# Patient Record
Sex: FEMALE | Race: WHITE | NOT HISPANIC OR LATINO
[De-identification: names, ages, dates, MRNs, and addresses within clinical notes are randomized per-mention and may not be internally consistent; named-entity substitution may affect disease eponyms.]

---

## 2021-07-08 ENCOUNTER — RESULT REVIEW (OUTPATIENT)
Age: 76
End: 2021-07-08

## 2023-02-01 ENCOUNTER — APPOINTMENT (OUTPATIENT)
Dept: ORTHOPEDIC SURGERY | Facility: CLINIC | Age: 78
End: 2023-02-01

## 2023-09-22 ENCOUNTER — APPOINTMENT (OUTPATIENT)
Dept: ORTHOPEDIC SURGERY | Facility: CLINIC | Age: 78
End: 2023-09-22
Payer: MEDICARE

## 2023-09-22 VITALS
WEIGHT: 170 LBS | BODY MASS INDEX: 29.02 KG/M2 | HEIGHT: 64 IN | HEART RATE: 60 BPM | DIASTOLIC BLOOD PRESSURE: 79 MMHG | SYSTOLIC BLOOD PRESSURE: 118 MMHG

## 2023-09-22 DIAGNOSIS — N28.9 DISORDER OF KIDNEY AND URETER, UNSPECIFIED: ICD-10-CM

## 2023-09-22 DIAGNOSIS — M17.5 OTHER UNILATERAL SECONDARY OSTEOARTHRITIS OF KNEE: ICD-10-CM

## 2023-09-22 DIAGNOSIS — Z80.0 FAMILY HISTORY OF MALIGNANT NEOPLASM OF DIGESTIVE ORGANS: ICD-10-CM

## 2023-09-22 PROCEDURE — 20610 DRAIN/INJ JOINT/BURSA W/O US: CPT | Mod: 50

## 2023-09-22 PROCEDURE — 72170 X-RAY EXAM OF PELVIS: CPT | Mod: 59

## 2023-09-22 PROCEDURE — 99204 OFFICE O/P NEW MOD 45 MIN: CPT | Mod: 25

## 2023-09-22 PROCEDURE — 73564 X-RAY EXAM KNEE 4 OR MORE: CPT | Mod: 50

## 2023-09-22 RX ORDER — HYALURONATE SOD, CROSS-LINKED 30 MG/3 ML
30 SYRINGE (ML) INTRAARTICULAR
Refills: 0 | Status: COMPLETED | OUTPATIENT
Start: 2023-09-22

## 2023-09-22 RX ADMIN — Medication 0 MG/3ML: at 00:00

## 2023-11-09 ENCOUNTER — APPOINTMENT (OUTPATIENT)
Dept: ORTHOPEDIC SURGERY | Facility: CLINIC | Age: 78
End: 2023-11-09
Payer: MEDICARE

## 2023-11-09 PROCEDURE — 99214 OFFICE O/P EST MOD 30 MIN: CPT

## 2023-11-29 ENCOUNTER — APPOINTMENT (OUTPATIENT)
Dept: ORTHOPEDIC SURGERY | Facility: CLINIC | Age: 78
End: 2023-11-29
Payer: MEDICARE

## 2023-11-29 DIAGNOSIS — M17.0 BILATERAL PRIMARY OSTEOARTHRITIS OF KNEE: ICD-10-CM

## 2023-11-29 PROCEDURE — 99213 OFFICE O/P EST LOW 20 MIN: CPT

## 2024-07-25 ENCOUNTER — APPOINTMENT (OUTPATIENT)
Dept: ORTHOPEDIC SURGERY | Facility: CLINIC | Age: 79
End: 2024-07-25
Payer: MEDICARE

## 2024-07-25 DIAGNOSIS — M25.552 PAIN IN LEFT HIP: ICD-10-CM

## 2024-07-25 DIAGNOSIS — M89.8X5 OTHER SPECIFIED DISORDERS OF BONE, THIGH: ICD-10-CM

## 2024-07-25 PROCEDURE — 73502 X-RAY EXAM HIP UNI 2-3 VIEWS: CPT

## 2024-07-25 PROCEDURE — 99214 OFFICE O/P EST MOD 30 MIN: CPT

## 2024-07-25 NOTE — HISTORY OF PRESENT ILLNESS
[de-identified] : Patient presents for follow-up of bilateral left greater than right knee pain and new onset left hip pain for the past week and a half.  She has a history of CKD and is borderline dialysis candidate.  We have previously treated her knees with MRIs and injections which did not provide much relief.  Her left hip is bothering her to walk

## 2024-07-25 NOTE — DISCUSSION/SUMMARY
[de-identified] : I discussed and discussed x-rays and clinical findings with the patient.  Explained that I would like to obtain an MRI of the left hip to evaluate the lytic lesion in the intertrochanteric region.  She has borderline dilated dialysis patient therefore it was ordered without contrast.  Regarding her bilateral knees she had given her severe CKD her risk of complications from surgery would be significantly higher than average.  I like her to see the pain management/PMNR doctors for possible radiofrequency ablation of the genicular nerve.  We also can consider IR for nerve ablation if the radiofrequency ablation does not work.  She will follow-up with me after the MRI is complete.  I discussed that she develop worsening pain with weightbearing she should go to the ER.  All questions were answered she is agreement the plan

## 2024-07-25 NOTE — PHYSICAL EXAM
[de-identified] : General Appearance / Station: Well developed, well nourished, in no acute distress Orientation: Oriented to person, place, and time Gait & Station: Ambulates without assistive device Neurologic: Normal leg sensation Cardiovascular: Warm extremity Lymphatics: No lymphedema Generalized Ligament Laxity: Normal Stiffness: Normal.  LUMBAR SPINE Nontender at lumbar spine Straight leg raise: Negative Motor: 5/5 motor L2-S1 Sensation Intact. No paresthesias L2-S1  SYMPTOMATIC LEFT HIP Range of motion: PAINFUL internal and external rotation of the hip. Strength: Within Normal Limits. Negative Trendelenburg sign                                                                      FADIR: POSITIVE Stinchfield: POSITIVE, with groin pain Palpation: TENDER at greater trochanter, Nontender SI joint                    LEFT KNEE Alignment: Neutral Skin: Normal.  Effusion: None Quadriceps: Normal Range of motion: Normal PF crepitus: 1+ PF apprehension: None Patella / Patella Tendon: None Palpation: medial/ lateral joint line  RIGHT  KNEE Alignment: Neutral Skin: Normal.  Effusion: None Quadriceps: Normal Range of motion: Normal PF crepitus: 1+ PF apprehension: None Patella / Patella Tendon: None Palpation: medial/ lateral joint line [de-identified] : Imaging: AP Pelvis and lateral views of the left hip show left hip mild joint space narrowing.  There is a lytic lesion in the inner troches region of the left hip with sclerotic border.   The soft tissues appear unremarkable

## 2024-08-04 ENCOUNTER — OUTPATIENT (OUTPATIENT)
Dept: OUTPATIENT SERVICES | Facility: HOSPITAL | Age: 79
LOS: 1 days | End: 2024-08-04
Payer: MEDICARE

## 2024-08-04 DIAGNOSIS — Z00.8 ENCOUNTER FOR OTHER GENERAL EXAMINATION: ICD-10-CM

## 2024-08-04 PROCEDURE — 73721 MRI JNT OF LWR EXTRE W/O DYE: CPT

## 2024-08-07 ENCOUNTER — NON-APPOINTMENT (OUTPATIENT)
Age: 79
End: 2024-08-07

## 2024-08-08 ENCOUNTER — APPOINTMENT (OUTPATIENT)
Dept: ORTHOPEDIC SURGERY | Facility: CLINIC | Age: 79
End: 2024-08-08

## 2024-08-08 PROBLEM — S73.192A TEAR OF LEFT ACETABULAR LABRUM, INITIAL ENCOUNTER: Status: ACTIVE | Noted: 2024-08-08

## 2024-08-08 PROCEDURE — 99213 OFFICE O/P EST LOW 20 MIN: CPT

## 2024-08-09 NOTE — DISCUSSION/SUMMARY
[de-identified] : I discussed treatment options with the patient.  She will continue conservative treatment as she cannot undergo surgery due to her significant kidney disease.  She cannot take any anti-inflammatories.  She is given a Medrol Dosepak for symptom relief.  She will follow-up in several months at her convenience.  She was told about a hypointense mass within the pelvis which could be fibroid versus other pathology and she is going to follow-up with her primary care doctor for further workup.  All questions were answered he she is in agreement the plan

## 2024-08-09 NOTE — DISCUSSION/SUMMARY
[de-identified] : I discussed treatment options with the patient.  She will continue conservative treatment as she cannot undergo surgery due to her significant kidney disease.  She cannot take any anti-inflammatories.  She is given a Medrol Dosepak for symptom relief.  She will follow-up in several months at her convenience.  She was told about a hypointense mass within the pelvis which could be fibroid versus other pathology and she is going to follow-up with her primary care doctor for further workup.  All questions were answered he she is in agreement the plan

## 2024-08-09 NOTE — PHYSICAL EXAM
[de-identified] : General Appearance / Station: Well developed, well nourished, in no acute distress Orientation: Oriented to person, place, and time Gait & Station: Ambulates without assistive device Neurologic: Normal leg sensation Cardiovascular: Warm extremity Lymphatics: No lymphedema Generalized Ligament Laxity: Normal Stiffness: Normal.  LUMBAR SPINE Nontender at lumbar spine Straight leg raise: Negative Motor: 5/5 motor L2-S1 Sensation Intact. No paresthesias L2-S1  SYMPTOMATIC LEFT HIP Range of motion: PAINFUL internal and external rotation of the hip. Strength: Within Normal Limits. Negative Trendelenburg sign                                                                      FADIR: POSITIVE Stinchfield: POSITIVE, with groin pain Palpation: TENDER at greater trochanter, Nontender SI joint                    LEFT KNEE Alignment: Neutral Skin: Normal.  Effusion: None Quadriceps: Normal Range of motion: Normal PF crepitus: 1+ PF apprehension: None Patella / Patella Tendon: None Palpation: medial/ lateral joint line  RIGHT  KNEE Alignment: Neutral Skin: Normal.  Effusion: None Quadriceps: Normal Range of motion: Normal PF crepitus: 1+ PF apprehension: None Patella / Patella Tendon: None Palpation: medial/ lateral joint line [de-identified] : EXAM: [id number] - MR HIP LT  - ORDERED BY: MAT FLETCHER   PROCEDURE DATE:  08/04/2024    INTERPRETATION:  CLINICAL INFORMATION: Left hip pain for 1 month.  COMPARISON: None.  CONTRAST: IV Contrast: None.  TECHNIQUE: MRI of the LEFT HIP was performed.  FINDINGS:  OSSEOUS STRUCTURES AND MORPHOLOGY: No acute fracture.  HIP JOINT: Mild chondral fissuring along the anterior acetabulum with underlying subchondral edema. No significant hip joint effusion. Degenerative nondisplaced tearing of the anterosuperior labrum with free edge blunting/fraying of the anteroinferior labrum.  TENDONS AND MUSCLES: Mild/moderate tendinosis of the hamstring tendon origin. Left rectus femoris origin is intact. Chronic appearing attenuation of the left gluteus minimus insertion, likely related to chronic partial tearing with enthesopathic changes. Mild tendinosis of the left gluteus medius insertion. Left iliopsoas insertion is intact. Mild left greater trochanteric bursitis.  NERVES: Unremarkable.  INTRAPELVIC STRUCTURES: Incompletely imaged indeterminate lobulated PD intermediate to hypointense mass within the pelvis, measuring at least 5.4 cm in greatest dimension.  SUBCUTANEOUS TISSUES: Unremarkable.  IMPRESSION:  Incompletely imaged indeterminate lobulated PD intermediate to hypointense mass within the pelvis, which may represent a uterine fibroid. Alternative etiologies include an adnexal mass/neoplasm, colonic mass/neoplasm, or other etiology. Recommend further evaluation with abdomen/pelvis CT with intravenous contrast to rule out neoplastic etiologies.  Minimal left hip chondral wear with degenerative tearing of the labrum, as above.  Mild/moderate tendinosis of the hamstring tendon origin.  Chronic attenuation of the left gluteus minimus insertion, likely related to chronic partial tearing with enthesopathic changes. Mild tendinosis of left gluteus medius insertion. Mild left greater trochanteric bursitis.

## 2024-08-09 NOTE — HISTORY OF PRESENT ILLNESS
[de-identified] : Patient presents for follow-up of left hip pain.  At her last visit she had an x-ray with concern for possible lytic lesion and recommend undergoing an MRI.  She underwent the MRI and is here for MRI review.  No significant abnormality was noted on MRI

## 2024-08-09 NOTE — HISTORY OF PRESENT ILLNESS
[de-identified] : Patient presents for follow-up of left hip pain.  At her last visit she had an x-ray with concern for possible lytic lesion and recommend undergoing an MRI.  She underwent the MRI and is here for MRI review.  No significant abnormality was noted on MRI

## 2024-08-09 NOTE — PHYSICAL EXAM
[de-identified] : General Appearance / Station: Well developed, well nourished, in no acute distress Orientation: Oriented to person, place, and time Gait & Station: Ambulates without assistive device Neurologic: Normal leg sensation Cardiovascular: Warm extremity Lymphatics: No lymphedema Generalized Ligament Laxity: Normal Stiffness: Normal.  LUMBAR SPINE Nontender at lumbar spine Straight leg raise: Negative Motor: 5/5 motor L2-S1 Sensation Intact. No paresthesias L2-S1  SYMPTOMATIC LEFT HIP Range of motion: PAINFUL internal and external rotation of the hip. Strength: Within Normal Limits. Negative Trendelenburg sign                                                                      FADIR: POSITIVE Stinchfield: POSITIVE, with groin pain Palpation: TENDER at greater trochanter, Nontender SI joint                    LEFT KNEE Alignment: Neutral Skin: Normal.  Effusion: None Quadriceps: Normal Range of motion: Normal PF crepitus: 1+ PF apprehension: None Patella / Patella Tendon: None Palpation: medial/ lateral joint line  RIGHT  KNEE Alignment: Neutral Skin: Normal.  Effusion: None Quadriceps: Normal Range of motion: Normal PF crepitus: 1+ PF apprehension: None Patella / Patella Tendon: None Palpation: medial/ lateral joint line [de-identified] : EXAM: [id number] - MR HIP LT  - ORDERED BY: MAT FLETCHER   PROCEDURE DATE:  08/04/2024    INTERPRETATION:  CLINICAL INFORMATION: Left hip pain for 1 month.  COMPARISON: None.  CONTRAST: IV Contrast: None.  TECHNIQUE: MRI of the LEFT HIP was performed.  FINDINGS:  OSSEOUS STRUCTURES AND MORPHOLOGY: No acute fracture.  HIP JOINT: Mild chondral fissuring along the anterior acetabulum with underlying subchondral edema. No significant hip joint effusion. Degenerative nondisplaced tearing of the anterosuperior labrum with free edge blunting/fraying of the anteroinferior labrum.  TENDONS AND MUSCLES: Mild/moderate tendinosis of the hamstring tendon origin. Left rectus femoris origin is intact. Chronic appearing attenuation of the left gluteus minimus insertion, likely related to chronic partial tearing with enthesopathic changes. Mild tendinosis of the left gluteus medius insertion. Left iliopsoas insertion is intact. Mild left greater trochanteric bursitis.  NERVES: Unremarkable.  INTRAPELVIC STRUCTURES: Incompletely imaged indeterminate lobulated PD intermediate to hypointense mass within the pelvis, measuring at least 5.4 cm in greatest dimension.  SUBCUTANEOUS TISSUES: Unremarkable.  IMPRESSION:  Incompletely imaged indeterminate lobulated PD intermediate to hypointense mass within the pelvis, which may represent a uterine fibroid. Alternative etiologies include an adnexal mass/neoplasm, colonic mass/neoplasm, or other etiology. Recommend further evaluation with abdomen/pelvis CT with intravenous contrast to rule out neoplastic etiologies.  Minimal left hip chondral wear with degenerative tearing of the labrum, as above.  Mild/moderate tendinosis of the hamstring tendon origin.  Chronic attenuation of the left gluteus minimus insertion, likely related to chronic partial tearing with enthesopathic changes. Mild tendinosis of left gluteus medius insertion. Mild left greater trochanteric bursitis.

## 2024-08-09 NOTE — DISCUSSION/SUMMARY
[de-identified] : I discussed treatment options with the patient.  She will continue conservative treatment as she cannot undergo surgery due to her significant kidney disease.  She cannot take any anti-inflammatories.  She is given a Medrol Dosepak for symptom relief.  She will follow-up in several months at her convenience.  She was told about a hypointense mass within the pelvis which could be fibroid versus other pathology and she is going to follow-up with her primary care doctor for further workup.  All questions were answered he she is in agreement the plan

## 2024-08-09 NOTE — PHYSICAL EXAM
[de-identified] : General Appearance / Station: Well developed, well nourished, in no acute distress Orientation: Oriented to person, place, and time Gait & Station: Ambulates without assistive device Neurologic: Normal leg sensation Cardiovascular: Warm extremity Lymphatics: No lymphedema Generalized Ligament Laxity: Normal Stiffness: Normal.  LUMBAR SPINE Nontender at lumbar spine Straight leg raise: Negative Motor: 5/5 motor L2-S1 Sensation Intact. No paresthesias L2-S1  SYMPTOMATIC LEFT HIP Range of motion: PAINFUL internal and external rotation of the hip. Strength: Within Normal Limits. Negative Trendelenburg sign                                                                      FADIR: POSITIVE Stinchfield: POSITIVE, with groin pain Palpation: TENDER at greater trochanter, Nontender SI joint                    LEFT KNEE Alignment: Neutral Skin: Normal.  Effusion: None Quadriceps: Normal Range of motion: Normal PF crepitus: 1+ PF apprehension: None Patella / Patella Tendon: None Palpation: medial/ lateral joint line  RIGHT  KNEE Alignment: Neutral Skin: Normal.  Effusion: None Quadriceps: Normal Range of motion: Normal PF crepitus: 1+ PF apprehension: None Patella / Patella Tendon: None Palpation: medial/ lateral joint line [de-identified] : EXAM: [id number] - MR HIP LT  - ORDERED BY: MAT FLETCHER   PROCEDURE DATE:  08/04/2024    INTERPRETATION:  CLINICAL INFORMATION: Left hip pain for 1 month.  COMPARISON: None.  CONTRAST: IV Contrast: None.  TECHNIQUE: MRI of the LEFT HIP was performed.  FINDINGS:  OSSEOUS STRUCTURES AND MORPHOLOGY: No acute fracture.  HIP JOINT: Mild chondral fissuring along the anterior acetabulum with underlying subchondral edema. No significant hip joint effusion. Degenerative nondisplaced tearing of the anterosuperior labrum with free edge blunting/fraying of the anteroinferior labrum.  TENDONS AND MUSCLES: Mild/moderate tendinosis of the hamstring tendon origin. Left rectus femoris origin is intact. Chronic appearing attenuation of the left gluteus minimus insertion, likely related to chronic partial tearing with enthesopathic changes. Mild tendinosis of the left gluteus medius insertion. Left iliopsoas insertion is intact. Mild left greater trochanteric bursitis.  NERVES: Unremarkable.  INTRAPELVIC STRUCTURES: Incompletely imaged indeterminate lobulated PD intermediate to hypointense mass within the pelvis, measuring at least 5.4 cm in greatest dimension.  SUBCUTANEOUS TISSUES: Unremarkable.  IMPRESSION:  Incompletely imaged indeterminate lobulated PD intermediate to hypointense mass within the pelvis, which may represent a uterine fibroid. Alternative etiologies include an adnexal mass/neoplasm, colonic mass/neoplasm, or other etiology. Recommend further evaluation with abdomen/pelvis CT with intravenous contrast to rule out neoplastic etiologies.  Minimal left hip chondral wear with degenerative tearing of the labrum, as above.  Mild/moderate tendinosis of the hamstring tendon origin.  Chronic attenuation of the left gluteus minimus insertion, likely related to chronic partial tearing with enthesopathic changes. Mild tendinosis of left gluteus medius insertion. Mild left greater trochanteric bursitis.

## 2024-09-17 NOTE — HISTORY OF PRESENT ILLNESS
[de-identified] : Patient presents for follow-up of left hip pain.  At her last visit she had an x-ray with concern for possible lytic lesion and recommend undergoing an MRI.  She underwent the MRI and is here for MRI review.  No significant abnormality was noted on MRI [Use of Plain Language] : use of plain language [Adequate] : adequate [None] : none [] : I have reviewed management goals with caretaker and provided a copy of care plan

## 2024-10-17 ENCOUNTER — APPOINTMENT (OUTPATIENT)
Dept: ORTHOPEDIC SURGERY | Facility: CLINIC | Age: 79
End: 2024-10-17

## 2025-01-09 ENCOUNTER — APPOINTMENT (OUTPATIENT)
Dept: ORTHOPEDIC SURGERY | Facility: CLINIC | Age: 80
End: 2025-01-09
Payer: MEDICARE

## 2025-01-09 PROCEDURE — 99214 OFFICE O/P EST MOD 30 MIN: CPT

## 2025-01-09 PROCEDURE — 73564 X-RAY EXAM KNEE 4 OR MORE: CPT | Mod: 50

## 2025-01-14 ENCOUNTER — APPOINTMENT (OUTPATIENT)
Dept: PHYSICAL MEDICINE AND REHAB | Facility: CLINIC | Age: 80
End: 2025-01-14

## 2025-01-23 ENCOUNTER — APPOINTMENT (OUTPATIENT)
Dept: SPORTS MEDICINE | Facility: CLINIC | Age: 80
End: 2025-01-23

## 2025-01-23 VITALS — HEIGHT: 64 IN | BODY MASS INDEX: 25.61 KG/M2 | WEIGHT: 150 LBS

## 2025-01-23 DIAGNOSIS — M17.0 BILATERAL PRIMARY OSTEOARTHRITIS OF KNEE: ICD-10-CM

## 2025-01-23 PROCEDURE — 99204 OFFICE O/P NEW MOD 45 MIN: CPT | Mod: 25

## 2025-01-23 PROCEDURE — 20611 DRAIN/INJ JOINT/BURSA W/US: CPT | Mod: 50

## 2025-02-04 ENCOUNTER — APPOINTMENT (OUTPATIENT)
Facility: CLINIC | Age: 80
End: 2025-02-04
Payer: MEDICARE

## 2025-02-04 ENCOUNTER — OUTPATIENT (OUTPATIENT)
Dept: OUTPATIENT SERVICES | Facility: HOSPITAL | Age: 80
LOS: 1 days | End: 2025-02-04
Payer: MEDICARE

## 2025-02-04 ENCOUNTER — APPOINTMENT (OUTPATIENT)
Facility: CLINIC | Age: 80
End: 2025-02-04

## 2025-02-04 DIAGNOSIS — M17.0 BILATERAL PRIMARY OSTEOARTHRITIS OF KNEE: ICD-10-CM

## 2025-02-04 DIAGNOSIS — M25.562 PAIN IN LEFT KNEE: ICD-10-CM

## 2025-02-04 DIAGNOSIS — M25.561 PAIN IN RIGHT KNEE: ICD-10-CM

## 2025-02-04 PROCEDURE — 77002 NEEDLE LOCALIZATION BY XRAY: CPT

## 2025-02-04 PROCEDURE — 64454 NJX AA&/STRD GNCLR NRV BRNCH: CPT | Mod: 50

## 2025-02-04 PROCEDURE — 64454 NJX AA&/STRD GNCLR NRV BRNCH: CPT

## 2025-02-11 ENCOUNTER — OUTPATIENT (OUTPATIENT)
Dept: OUTPATIENT SERVICES | Facility: HOSPITAL | Age: 80
LOS: 1 days | End: 2025-02-11
Payer: MEDICARE

## 2025-02-11 ENCOUNTER — APPOINTMENT (OUTPATIENT)
Facility: CLINIC | Age: 80
End: 2025-02-11
Payer: MEDICARE

## 2025-02-11 DIAGNOSIS — M17.0 BILATERAL PRIMARY OSTEOARTHRITIS OF KNEE: ICD-10-CM

## 2025-02-11 PROCEDURE — 64454 NJX AA&/STRD GNCLR NRV BRNCH: CPT

## 2025-02-11 PROCEDURE — 77002 NEEDLE LOCALIZATION BY XRAY: CPT

## 2025-02-11 PROCEDURE — 64454 NJX AA&/STRD GNCLR NRV BRNCH: CPT | Mod: 50

## 2025-02-13 ENCOUNTER — NON-APPOINTMENT (OUTPATIENT)
Age: 80
End: 2025-02-13

## 2025-02-20 ENCOUNTER — APPOINTMENT (OUTPATIENT)
Dept: PHYSICAL MEDICINE AND REHAB | Facility: CLINIC | Age: 80
End: 2025-02-20

## 2025-02-20 ENCOUNTER — APPOINTMENT (OUTPATIENT)
Dept: PHYSICAL MEDICINE AND REHAB | Facility: CLINIC | Age: 80
End: 2025-02-20
Payer: MEDICARE

## 2025-02-20 DIAGNOSIS — M17.5 OTHER UNILATERAL SECONDARY OSTEOARTHRITIS OF KNEE: ICD-10-CM

## 2025-02-20 DIAGNOSIS — M17.0 BILATERAL PRIMARY OSTEOARTHRITIS OF KNEE: ICD-10-CM

## 2025-02-20 PROCEDURE — 99204 OFFICE O/P NEW MOD 45 MIN: CPT

## 2025-02-25 ENCOUNTER — APPOINTMENT (OUTPATIENT)
Dept: SPORTS MEDICINE | Facility: CLINIC | Age: 80
End: 2025-02-25

## 2025-03-03 ENCOUNTER — APPOINTMENT (OUTPATIENT)
Dept: PHYSICAL MEDICINE AND REHAB | Facility: CLINIC | Age: 80
End: 2025-03-03
Payer: MEDICARE

## 2025-03-03 DIAGNOSIS — M17.0 BILATERAL PRIMARY OSTEOARTHRITIS OF KNEE: ICD-10-CM

## 2025-03-03 PROCEDURE — 99213 OFFICE O/P EST LOW 20 MIN: CPT | Mod: 93

## 2025-03-07 ENCOUNTER — OUTPATIENT (OUTPATIENT)
Dept: OUTPATIENT SERVICES | Facility: HOSPITAL | Age: 80
LOS: 1 days | End: 2025-03-07
Payer: MEDICARE

## 2025-03-07 ENCOUNTER — APPOINTMENT (OUTPATIENT)
Dept: PHYSICAL MEDICINE AND REHAB | Facility: CLINIC | Age: 80
End: 2025-03-07
Payer: MEDICARE

## 2025-03-07 DIAGNOSIS — M25.562 PAIN IN LEFT KNEE: ICD-10-CM

## 2025-03-07 DIAGNOSIS — M54.16 RADICULOPATHY, LUMBAR REGION: ICD-10-CM

## 2025-03-07 DIAGNOSIS — M17.5 OTHER UNILATERAL SECONDARY OSTEOARTHRITIS OF KNEE: ICD-10-CM

## 2025-03-07 PROCEDURE — 77002 NEEDLE LOCALIZATION BY XRAY: CPT

## 2025-03-07 PROCEDURE — 64624 DSTRJ NULYT AGT GNCLR NRV: CPT | Mod: LT

## 2025-03-07 PROCEDURE — 64624 DSTRJ NULYT AGT GNCLR NRV: CPT

## 2025-03-20 ENCOUNTER — APPOINTMENT (OUTPATIENT)
Dept: PHYSICAL MEDICINE AND REHAB | Facility: CLINIC | Age: 80
End: 2025-03-20
Payer: MEDICARE

## 2025-03-20 DIAGNOSIS — M17.5 OTHER UNILATERAL SECONDARY OSTEOARTHRITIS OF KNEE: ICD-10-CM

## 2025-03-20 DIAGNOSIS — M17.0 BILATERAL PRIMARY OSTEOARTHRITIS OF KNEE: ICD-10-CM

## 2025-03-20 PROCEDURE — 99214 OFFICE O/P EST MOD 30 MIN: CPT

## 2025-03-28 ENCOUNTER — APPOINTMENT (OUTPATIENT)
Dept: PHYSICAL MEDICINE AND REHAB | Facility: CLINIC | Age: 80
End: 2025-03-28
Payer: MEDICARE

## 2025-03-28 ENCOUNTER — OUTPATIENT (OUTPATIENT)
Dept: OUTPATIENT SERVICES | Facility: HOSPITAL | Age: 80
LOS: 1 days | End: 2025-03-28
Payer: MEDICARE

## 2025-03-28 DIAGNOSIS — M17.5 OTHER UNILATERAL SECONDARY OSTEOARTHRITIS OF KNEE: ICD-10-CM

## 2025-03-28 DIAGNOSIS — M25.561 PAIN IN RIGHT KNEE: ICD-10-CM

## 2025-03-28 PROCEDURE — 64624 DSTRJ NULYT AGT GNCLR NRV: CPT

## 2025-03-28 PROCEDURE — 64624 DSTRJ NULYT AGT GNCLR NRV: CPT | Mod: RT

## 2025-05-09 ENCOUNTER — NON-APPOINTMENT (OUTPATIENT)
Age: 80
End: 2025-05-09

## 2025-05-15 ENCOUNTER — APPOINTMENT (OUTPATIENT)
Dept: PHYSICAL MEDICINE AND REHAB | Facility: CLINIC | Age: 80
End: 2025-05-15
Payer: MEDICARE

## 2025-05-15 DIAGNOSIS — M17.5 OTHER UNILATERAL SECONDARY OSTEOARTHRITIS OF KNEE: ICD-10-CM

## 2025-05-15 PROCEDURE — 99204 OFFICE O/P NEW MOD 45 MIN: CPT

## 2025-05-15 PROCEDURE — 99214 OFFICE O/P EST MOD 30 MIN: CPT
